# Patient Record
Sex: FEMALE | Race: WHITE | NOT HISPANIC OR LATINO | Employment: UNEMPLOYED | URBAN - NONMETROPOLITAN AREA
[De-identification: names, ages, dates, MRNs, and addresses within clinical notes are randomized per-mention and may not be internally consistent; named-entity substitution may affect disease eponyms.]

---

## 2019-06-13 DIAGNOSIS — M25.512 LEFT SHOULDER PAIN, UNSPECIFIED CHRONICITY: Primary | ICD-10-CM

## 2019-06-14 ENCOUNTER — OFFICE VISIT (OUTPATIENT)
Dept: ORTHOPEDIC SURGERY | Facility: CLINIC | Age: 69
End: 2019-06-14

## 2019-06-14 ENCOUNTER — HOSPITAL ENCOUNTER (OUTPATIENT)
Dept: GENERAL RADIOLOGY | Facility: HOSPITAL | Age: 69
Discharge: HOME | End: 2019-06-14
Admitting: ORTHOPAEDIC SURGERY

## 2019-06-14 VITALS — WEIGHT: 111 LBS | HEIGHT: 63 IN | BODY MASS INDEX: 19.67 KG/M2

## 2019-06-14 DIAGNOSIS — M25.512 LEFT SHOULDER PAIN, UNSPECIFIED CHRONICITY: ICD-10-CM

## 2019-06-14 DIAGNOSIS — M75.02 ADHESIVE CAPSULITIS OF LEFT SHOULDER: Primary | ICD-10-CM

## 2019-06-14 PROCEDURE — 73030 X-RAY EXAM OF SHOULDER: CPT | Performed by: RADIOLOGY

## 2019-06-14 PROCEDURE — 99203 OFFICE O/P NEW LOW 30 MIN: CPT | Performed by: ORTHOPAEDIC SURGERY

## 2019-06-14 PROCEDURE — 20610 DRAIN/INJ JOINT/BURSA W/O US: CPT | Performed by: ORTHOPAEDIC SURGERY

## 2019-06-14 PROCEDURE — 73030 X-RAY EXAM OF SHOULDER: CPT

## 2019-06-14 RX ORDER — NAPROXEN 500 MG/1
500 TABLET ORAL 2 TIMES DAILY PRN
COMMUNITY
Start: 2019-04-12

## 2019-06-14 RX ORDER — IBUPROFEN 200 MG
200 TABLET ORAL EVERY 6 HOURS PRN
COMMUNITY

## 2019-06-14 RX ADMIN — BUPIVACAINE HYDROCHLORIDE 5 ML: 5 INJECTION, SOLUTION EPIDURAL; INTRACAUDAL at 15:07

## 2019-06-14 RX ADMIN — METHYLPREDNISOLONE ACETATE 80 MG: 40 INJECTION, SUSPENSION INTRA-ARTICULAR; INTRALESIONAL; INTRAMUSCULAR; SOFT TISSUE at 15:07

## 2019-06-14 NOTE — PROGRESS NOTES
"Patient: Christina Fernandez    YOB: 1950    Chief Complaint   Patient presents with   • Left Shoulder - Pain         History of Present Illness: 68-year-old white female who presents for evaluation of left shoulder pain.  Constant pain is been getting worse and worse over the last several months.  No specific injury or trauma or change in activity level.  No specific paresthesias or any recent procedures.  Nondiabetic.  Non-smoker.  Does been taken some anti-inflammatories.  Sometimes she has to use a sling and hold her arm.  No swelling.  She was given an injection by her family doctor did not seem to do too much    Past Medical History:   Diagnosis Date   • Arthritis 11/2015    Osteo         Social History     Socioeconomic History   • Marital status:      Spouse name: Not on file   • Number of children: Not on file   • Years of education: Not on file   • Highest education level: Not on file   Tobacco Use   • Smoking status: Never Smoker   • Smokeless tobacco: Never Used   Substance and Sexual Activity   • Alcohol use: Yes     Comment: twice a year   • Drug use: No   • Sexual activity: Defer           Physical Exam: 68 y.o. female  General Appearance:    Alert and oriented x 3, cooperative, in no acute distress                   Vitals:    06/14/19 1407   Weight: 50.3 kg (111 lb)   Height: 160 cm (63\")          Thin female no obvious acute distress holding her arm down by her side.  She has about 5 degrees of external rotation on her left compared to about 50 degrees on the right.  She can flex and abduct her right shoulder to about 160 degrees her left she can get up to about may be 100.  She has internal rotation to the SI joint on her left compared to her upper thoracic on the right.  She has good strength with subscap external rotators and supraspinatus bilaterally.  Hands are grossly neurovascular intact without swelling      Radiology:     X-rays taken today reviewed by myself are " unremarkable some mild acromioclavicular joint arthritis.  Patient did bring an MRI which was reviewed including the report and it showed may be some mild subacromial bursitis tendinitis but there is no obvious rotator cuff tear.  There is some thickening of the glenohumeral ligaments consistent with adhesive capsulitis    Large Joint Arthrocentesis: L glenohumeral  Date/Time: 6/14/2019 3:07 PM  Consent given by: patient  Supporting Documentation  Indications: pain and diagnostic evaluation   Procedure Details  Location: shoulder - L glenohumeral  Preparation: Patient was prepped and draped in the usual sterile fashion  Needle size: 25 G  Medications administered: 80 mg methylPREDNISolone acetate 40 MG/ML; 5 mL bupivacaine (PF) 0.5 %  Patient tolerance: patient tolerated the procedure well with no immediate complications          Assessment/Plan: Left shoulder pain and stiffness which seems to be consistent with adhesive capsulitis.  We will try 1 intra-articular injection with some steroid Marcaine.  We will also start her with physical therapy.  We will see her back in 6 weeks            Patient's Body mass index is 19.66 kg/m². BMI is within normal parameters. No follow-up required..      Discussion/Summary:                This chart was completed utilizing the dragon speech recognition software.  Grammatical errors, random word insertions, pronoun errors, and incomplete sentences or occasional consequences of the system due to software limitations, ambient noise, and hardware issues.  Any questions or concerns about the content, text, or information contained within the body of this dictation should be directly addressed to the physician for clarification        This document was signed by Joe Crook M.D. June 14, 2019 3:02 PM

## 2019-06-17 RX ORDER — BUPIVACAINE HYDROCHLORIDE 5 MG/ML
5 INJECTION, SOLUTION EPIDURAL; INTRACAUDAL
Status: COMPLETED | OUTPATIENT
Start: 2019-06-14 | End: 2019-06-14

## 2019-06-17 RX ORDER — METHYLPREDNISOLONE ACETATE 40 MG/ML
80 INJECTION, SUSPENSION INTRA-ARTICULAR; INTRALESIONAL; INTRAMUSCULAR; SOFT TISSUE
Status: COMPLETED | OUTPATIENT
Start: 2019-06-14 | End: 2019-06-14

## 2019-07-25 ENCOUNTER — OFFICE VISIT (OUTPATIENT)
Dept: ORTHOPEDIC SURGERY | Facility: CLINIC | Age: 69
End: 2019-07-25

## 2019-07-25 VITALS — BODY MASS INDEX: 19.49 KG/M2 | HEIGHT: 63 IN | WEIGHT: 110 LBS

## 2019-07-25 DIAGNOSIS — M75.02 ADHESIVE CAPSULITIS OF LEFT SHOULDER: Primary | ICD-10-CM

## 2019-07-25 PROCEDURE — 99212 OFFICE O/P EST SF 10 MIN: CPT | Performed by: ORTHOPAEDIC SURGERY

## 2019-07-25 NOTE — PROGRESS NOTES
"Christina Fernandez   :1950    Date of encounter:2019    Chief Complaint   Patient presents with   • Left Shoulder - Follow-up       HPI:  Christina Fernandez is a 68 y.o. female who returns here today for follow-up of adhesive capsulitis of the left shoulder.  At her last office visit she received intra-articular steroid injection as well as begin a course of formal physical therapy.  She states that both the injection and therapy seem to be improving pain and she is doing well without any significant complaints today.    PMH:   Patient Active Problem List   Diagnosis   • Seasonal allergies   • Osteoarthritis of left hip   • Adhesive capsulitis of left shoulder       Exam:  General Appearance:    68 y.o. female  cooperative, in no acute distress.  Alert and oriented x 3,                   Vitals:    19 1343   Weight: 49.9 kg (110 lb)   Height: 160 cm (63\")          Body mass index is 19.49 kg/m².      On examination she has full range of motion with forward flexion and abduction.  She can internally rotate to about T12/L1.  She has Archuleta 50 degrees of external rotation compared to 50 degrees on the right side.  She has good strength.  Her neurovascular status is intact.      Assessment    ICD-10-CM ICD-9-CM   1. Adhesive capsulitis of left shoulder M75.02 726.0       Plan:  68-year-old female with adhesive capsulitis of the left shoulder that has responded well to formal physical therapy and injection.  We do advised to finish of the remainder sessions of therapy.  She can continue activities as tolerated.  She will return back here on an as-needed basis with any further difficulties.    Written by, Vero ABERNATHY, acting as a scribe for Dr. Crook          "